# Patient Record
Sex: FEMALE | Race: ASIAN | NOT HISPANIC OR LATINO | URBAN - METROPOLITAN AREA
[De-identification: names, ages, dates, MRNs, and addresses within clinical notes are randomized per-mention and may not be internally consistent; named-entity substitution may affect disease eponyms.]

---

## 2017-06-20 ENCOUNTER — IMPORTED ENCOUNTER (OUTPATIENT)
Dept: URBAN - METROPOLITAN AREA CLINIC 1 | Facility: CLINIC | Age: 52
End: 2017-06-20

## 2017-06-20 PROBLEM — H43.811: Noted: 2017-06-20

## 2017-06-20 PROBLEM — H04.123: Noted: 2017-06-20

## 2017-06-20 PROBLEM — H25.813: Noted: 2017-06-20

## 2017-06-20 PROBLEM — H16.143: Noted: 2017-06-20

## 2017-06-20 PROCEDURE — 92015 DETERMINE REFRACTIVE STATE: CPT

## 2017-06-20 PROCEDURE — 92014 COMPRE OPH EXAM EST PT 1/>: CPT

## 2017-06-20 NOTE — PATIENT DISCUSSION
1.  Cataract OU: Observe for now without intervention. The patient was advised to contact us if any change or worsening of vision2. AMANDA w/ PEK OU- H/o punctal plugs internalized OU. Cont ATs QID OU. Consider Restasis/Xiidra will hold off for now 3. PVD w/o Tear OD - RD precautions. MRX for glasses given Return for an appointment for Return as scheduled with Dr. Reed Higgins. Return for an appointment in 1 year 27 with Dr. Rajendra Slade.

## 2018-07-16 ENCOUNTER — IMPORTED ENCOUNTER (OUTPATIENT)
Dept: URBAN - METROPOLITAN AREA CLINIC 1 | Facility: CLINIC | Age: 53
End: 2018-07-16

## 2018-07-16 PROBLEM — H25.813: Noted: 2018-07-16

## 2018-07-16 PROBLEM — H04.123: Noted: 2018-07-16

## 2018-07-16 PROBLEM — H43.811: Noted: 2018-07-16

## 2018-07-16 PROBLEM — H16.143: Noted: 2018-07-16

## 2018-07-16 PROCEDURE — 92015 DETERMINE REFRACTIVE STATE: CPT

## 2018-07-16 PROCEDURE — 92014 COMPRE OPH EXAM EST PT 1/>: CPT

## 2018-07-16 NOTE — PATIENT DISCUSSION
1.  AMANDA w/ PEK OU- Improved. The increase of artificial tears OU to QID were recommended routinely. Plugs internalized LLs OU. 2.  Cataract OU: Observe for now without intervention. The patient was advised to contact us if any change or worsening of vision3. PVD w/o Tear OD - RD precautions. Return for an appointment for 27 in 1 year with Dr. Jeremy Fitzgerald.

## 2019-08-01 ENCOUNTER — IMPORTED ENCOUNTER (OUTPATIENT)
Dept: URBAN - METROPOLITAN AREA CLINIC 1 | Facility: CLINIC | Age: 54
End: 2019-08-01

## 2019-08-01 PROBLEM — H16.143: Noted: 2019-08-01

## 2019-08-01 PROBLEM — H25.813: Noted: 2019-08-01

## 2019-08-01 PROBLEM — H43.813: Noted: 2019-08-01

## 2019-08-01 PROBLEM — H04.123: Noted: 2019-08-01

## 2019-08-01 PROCEDURE — 92015 DETERMINE REFRACTIVE STATE: CPT

## 2019-08-01 PROCEDURE — 92014 COMPRE OPH EXAM EST PT 1/>: CPT

## 2019-08-01 NOTE — PATIENT DISCUSSION
1.  Cataract OU: Observe for now without intervention. The patient was advised to contact us if any change or worsening of vision2. PVD OU - RD precautions. 3.  AMANDA w/ PEK OU- H/o Internalized Plugs LLs OU. Cont ATs QID OU Routinely. Letter to PCP MRx given Return for an appointment in 1 yr 27 with Dr. Matias Hogan.

## 2020-08-07 ENCOUNTER — IMPORTED ENCOUNTER (OUTPATIENT)
Dept: URBAN - METROPOLITAN AREA CLINIC 1 | Facility: CLINIC | Age: 55
End: 2020-08-07

## 2020-08-07 PROBLEM — H16.143: Noted: 2020-08-07

## 2020-08-07 PROBLEM — H43.813: Noted: 2020-08-07

## 2020-08-07 PROBLEM — H25.813: Noted: 2020-08-07

## 2020-08-07 PROBLEM — H04.123: Noted: 2020-08-07

## 2020-08-07 PROCEDURE — 92014 COMPRE OPH EXAM EST PT 1/>: CPT

## 2020-08-07 PROCEDURE — 92015 DETERMINE REFRACTIVE STATE: CPT

## 2020-08-07 NOTE — PATIENT DISCUSSION
1.  AMANDA w/ PEK OU-Recommend using ATs Q2HS OU routinely. Consider Restasis if symptoms worsen. 2. Cataract OU: Observe for now without intervention. The patient was advised to contact us if any change or worsening of vision3. PVD OU - RD precautions. Letter to [facility] for glasses given. Return for an appointment in 1 year 27 with Dr. Chula Roberts.

## 2021-08-05 ENCOUNTER — IMPORTED ENCOUNTER (OUTPATIENT)
Dept: URBAN - METROPOLITAN AREA CLINIC 1 | Facility: CLINIC | Age: 56
End: 2021-08-05

## 2021-08-05 PROBLEM — H04.123: Noted: 2021-08-05

## 2021-08-05 PROBLEM — H16.143: Noted: 2021-08-05

## 2021-08-05 PROBLEM — H25.813: Noted: 2021-08-05

## 2021-08-05 PROBLEM — H04.422: Noted: 2021-08-05

## 2021-08-05 PROCEDURE — 99214 OFFICE O/P EST MOD 30 MIN: CPT

## 2021-08-05 PROCEDURE — 92015 DETERMINE REFRACTIVE STATE: CPT

## 2021-08-05 NOTE — PATIENT DISCUSSION
1.  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 Will refer patient to Dr. Kalpana Riggs for a 3 snip procedure. Begin Ocuflox TID OS (erx'd) until seen by Dr. Kalpana Riggs. 2.  AMANDA w/ PEK OU -- LL plug internalized. Continue the use of OTC ATs QID or more as needed. Consider Xiidra/Restasis without improvement. 3. Cataract OU -- Observe for now without intervention. The patient was advised to contact us if any change or worsening of vision. 4.  PVD w/o tear OU -- RD precautions. Return for an appointment in 6 months for a 10 k check with Dr. Renetta Pittman.

## 2022-02-10 ENCOUNTER — FOLLOW UP (OUTPATIENT)
Dept: URBAN - METROPOLITAN AREA CLINIC 1 | Facility: CLINIC | Age: 57
End: 2022-02-10

## 2022-02-10 DIAGNOSIS — H04.123: ICD-10-CM

## 2022-02-10 PROCEDURE — 99213 OFFICE O/P EST LOW 20 MIN: CPT

## 2022-02-10 RX ORDER — CYCLOSPORINE 0.5 MG/ML: 1 EMULSION OPHTHALMIC TWICE A DAY

## 2022-02-10 ASSESSMENT — TONOMETRY
OD_IOP_MMHG: 13
OS_IOP_MMHG: 13

## 2022-02-10 ASSESSMENT — VISUAL ACUITY
OD_CC: J1
OD_CC: 20/20-2
OS_CC: J2
OS_CC: 20/20

## 2022-02-10 NOTE — PATIENT DISCUSSION
(LL Plugs internalized OU) No improvement on ATs. Begin Restasis BID OU (Erx'd). Continue ATs QID OU, routinely.

## 2022-04-02 ASSESSMENT — VISUAL ACUITY
OD_CC: J1
OS_SC: 20/20
OD_SC: 20/25
OD_CC: J1
OS_SC: 20/20
OS_CC: J1
OS_SC: 20/20
OS_SC: 20/25
OD_SC: 20/20
OD_SC: 20/25-2
OD_SC: 20/25-2
OD_SC: 20/30
OS_CC: J1
OS_SC: 20/20

## 2022-04-02 ASSESSMENT — TONOMETRY
OD_IOP_MMHG: 13
OD_IOP_MMHG: 13
OS_IOP_MMHG: 13
OD_IOP_MMHG: 12
OD_IOP_MMHG: 12
OD_IOP_MMHG: 14
OS_IOP_MMHG: 13
OS_IOP_MMHG: 14
OS_IOP_MMHG: 12
OS_IOP_MMHG: 13

## 2022-06-02 ENCOUNTER — FOLLOW UP (OUTPATIENT)
Dept: URBAN - METROPOLITAN AREA CLINIC 1 | Facility: CLINIC | Age: 57
End: 2022-06-02

## 2022-06-02 DIAGNOSIS — H04.123: ICD-10-CM

## 2022-06-02 PROCEDURE — 92012 INTRM OPH EXAM EST PATIENT: CPT

## 2022-06-02 ASSESSMENT — VISUAL ACUITY
OS_CC: 20/20
OD_CC: 20/20

## 2022-06-02 ASSESSMENT — TONOMETRY
OS_IOP_MMHG: 12
OD_IOP_MMHG: 12

## 2022-06-02 NOTE — PATIENT DISCUSSION
(LL Plugs internalized OU) PEK much improved. ContinueRestasis BID OU (Erx'd). Continue ATs QID OU, routinely.

## 2022-12-05 ENCOUNTER — COMPREHENSIVE EXAM (OUTPATIENT)
Dept: URBAN - METROPOLITAN AREA CLINIC 1 | Facility: CLINIC | Age: 57
End: 2022-12-05

## 2022-12-05 PROCEDURE — 92015 DETERMINE REFRACTIVE STATE: CPT

## 2022-12-05 PROCEDURE — 92014 COMPRE OPH EXAM EST PT 1/>: CPT

## 2022-12-05 ASSESSMENT — VISUAL ACUITY
OS_BAT: 20/60
OS_CC: J2
OS_CC: 20/20
OD_CC: J2
OD_BAT: 20/150
OD_CC: 20/20-2

## 2022-12-05 ASSESSMENT — TONOMETRY
OD_IOP_MMHG: 14
OS_IOP_MMHG: 14

## 2022-12-05 NOTE — PATIENT DISCUSSION
(LL Plugs internalized OU) PEK slightly increased today. Patient states that she has not taken Restasis in about a year. Recommend restarting Restasis BID OU (Erx'd again). Continue ATs QID OU, routinely.

## 2023-06-12 ENCOUNTER — FOLLOW UP (OUTPATIENT)
Dept: URBAN - METROPOLITAN AREA CLINIC 1 | Facility: CLINIC | Age: 58
End: 2023-06-12

## 2023-06-12 DIAGNOSIS — H25.813: ICD-10-CM

## 2023-06-12 DIAGNOSIS — H04.123: ICD-10-CM

## 2023-06-12 DIAGNOSIS — H16.143: ICD-10-CM

## 2023-06-12 PROCEDURE — 99213 OFFICE O/P EST LOW 20 MIN: CPT

## 2023-06-12 ASSESSMENT — VISUAL ACUITY
OS_CC: 20/20
OS_BAT: 20/60
OD_BAT: 20/150
OD_CC: 20/20

## 2023-06-12 ASSESSMENT — TONOMETRY
OD_IOP_MMHG: 15
OS_IOP_MMHG: 14

## 2023-12-18 ENCOUNTER — COMPREHENSIVE EXAM (OUTPATIENT)
Dept: URBAN - METROPOLITAN AREA CLINIC 1 | Facility: CLINIC | Age: 58
End: 2023-12-18

## 2023-12-18 DIAGNOSIS — H16.143: ICD-10-CM

## 2023-12-18 DIAGNOSIS — H43.811: ICD-10-CM

## 2023-12-18 DIAGNOSIS — H25.813: ICD-10-CM

## 2023-12-18 DIAGNOSIS — H04.123: ICD-10-CM

## 2023-12-18 PROCEDURE — 99214 OFFICE O/P EST MOD 30 MIN: CPT

## 2023-12-18 ASSESSMENT — VISUAL ACUITY
OD_CC: 20/25
OS_CC: J1
OD_BAT: 20/150
OD_CC: J1
OS_BAT: 20/200
OS_CC: 20/30

## 2023-12-18 ASSESSMENT — TONOMETRY
OD_IOP_MMHG: 15
OS_IOP_MMHG: 15

## 2024-06-13 ENCOUNTER — FOLLOW UP (OUTPATIENT)
Dept: URBAN - METROPOLITAN AREA CLINIC 1 | Facility: CLINIC | Age: 59
End: 2024-06-13

## 2024-06-13 DIAGNOSIS — H04.123: ICD-10-CM

## 2024-06-13 DIAGNOSIS — H16.143: ICD-10-CM

## 2024-06-13 DIAGNOSIS — H25.813: ICD-10-CM

## 2024-06-13 PROCEDURE — 99213 OFFICE O/P EST LOW 20 MIN: CPT

## 2024-06-13 PROCEDURE — 92015 DETERMINE REFRACTIVE STATE: CPT

## 2024-06-13 ASSESSMENT — VISUAL ACUITY
OS_BAT: 20/200
OD_BAT: 20/150
OS_CC: 20/30
OD_CC: 20/30

## 2024-06-13 ASSESSMENT — TONOMETRY
OS_IOP_MMHG: 15
OD_IOP_MMHG: 15

## 2024-07-03 ENCOUNTER — PRE-OP/H&P (OUTPATIENT)
Dept: URBAN - METROPOLITAN AREA CLINIC 1 | Facility: CLINIC | Age: 59
End: 2024-07-03

## 2024-07-03 VITALS
HEIGHT: 69 IN | WEIGHT: 129 LBS | SYSTOLIC BLOOD PRESSURE: 118 MMHG | BODY MASS INDEX: 19.11 KG/M2 | DIASTOLIC BLOOD PRESSURE: 68 MMHG

## 2024-07-03 DIAGNOSIS — H25.813: ICD-10-CM

## 2024-07-03 PROCEDURE — 92025 CPTRIZED CORNEAL TOPOGRAPHY: CPT | Mod: NC

## 2024-07-03 PROCEDURE — 99213 OFFICE O/P EST LOW 20 MIN: CPT

## 2024-07-03 PROCEDURE — 92136 OPHTHALMIC BIOMETRY: CPT

## 2024-07-03 ASSESSMENT — VISUAL ACUITY
OS_SC: 20/50
OS_BAT: 20/200
OD_BAT: 20/150
OD_SC: 20/50

## 2024-07-03 ASSESSMENT — TONOMETRY
OS_IOP_MMHG: 15
OD_IOP_MMHG: 15

## 2024-10-08 ENCOUNTER — PRE-OP/H&P (OUTPATIENT)
Dept: URBAN - METROPOLITAN AREA CLINIC 1 | Facility: CLINIC | Age: 59
End: 2024-10-08

## 2024-10-08 VITALS
DIASTOLIC BLOOD PRESSURE: 80 MMHG | WEIGHT: 129 LBS | BODY MASS INDEX: 19.11 KG/M2 | SYSTOLIC BLOOD PRESSURE: 139 MMHG | HEART RATE: 77 BPM | HEIGHT: 69 IN

## 2024-10-08 DIAGNOSIS — H25.813: ICD-10-CM

## 2024-10-08 PROCEDURE — 92136 OPHTHALMIC BIOMETRY: CPT

## 2024-10-08 PROCEDURE — 99213 OFFICE O/P EST LOW 20 MIN: CPT

## 2024-10-08 PROCEDURE — 92025 CPTRIZED CORNEAL TOPOGRAPHY: CPT

## 2024-10-30 ENCOUNTER — SURGERY/PROCEDURE (OUTPATIENT)
Dept: URBAN - METROPOLITAN AREA SURGERY 1 | Facility: SURGERY | Age: 59
End: 2024-10-30

## 2024-10-30 DIAGNOSIS — H25.812: ICD-10-CM

## 2024-10-30 PROCEDURE — 66984 XCAPSL CTRC RMVL W/O ECP: CPT

## 2024-10-31 ENCOUNTER — POST-OP (OUTPATIENT)
Dept: URBAN - METROPOLITAN AREA CLINIC 2 | Facility: CLINIC | Age: 59
End: 2024-10-31

## 2024-10-31 DIAGNOSIS — H25.811: ICD-10-CM

## 2024-10-31 DIAGNOSIS — Z96.1: ICD-10-CM

## 2024-10-31 PROCEDURE — 99024 POSTOP FOLLOW-UP VISIT: CPT

## 2024-11-05 ENCOUNTER — POST OP/EVAL OF SECOND EYE (OUTPATIENT)
Dept: URBAN - METROPOLITAN AREA CLINIC 1 | Facility: CLINIC | Age: 59
End: 2024-11-05

## 2024-11-05 VITALS
DIASTOLIC BLOOD PRESSURE: 79 MMHG | SYSTOLIC BLOOD PRESSURE: 147 MMHG | HEART RATE: 75 BPM | BODY MASS INDEX: 23.04 KG/M2 | HEIGHT: 63 IN | WEIGHT: 130 LBS

## 2024-11-05 DIAGNOSIS — Z96.1: ICD-10-CM

## 2024-11-05 DIAGNOSIS — H25.811: ICD-10-CM

## 2024-11-05 PROCEDURE — 92136 OPHTHALMIC BIOMETRY: CPT | Mod: 26

## 2024-11-05 PROCEDURE — 92025 CPTRIZED CORNEAL TOPOGRAPHY: CPT | Mod: NC

## 2024-11-13 ENCOUNTER — SURGERY/PROCEDURE (OUTPATIENT)
Dept: URBAN - METROPOLITAN AREA SURGERY 1 | Facility: SURGERY | Age: 59
End: 2024-11-13

## 2024-11-13 DIAGNOSIS — H25.811: ICD-10-CM

## 2024-11-13 PROCEDURE — 66984 XCAPSL CTRC RMVL W/O ECP: CPT | Mod: 79,RT

## 2024-11-14 ENCOUNTER — POST-OP (OUTPATIENT)
Dept: URBAN - METROPOLITAN AREA CLINIC 2 | Facility: CLINIC | Age: 59
End: 2024-11-14

## 2024-11-14 DIAGNOSIS — Z96.1: ICD-10-CM

## 2024-11-14 PROCEDURE — 99024 POSTOP FOLLOW-UP VISIT: CPT

## 2024-11-21 ENCOUNTER — DIAGNOSTICS ONLY (OUTPATIENT)
Dept: URBAN - METROPOLITAN AREA CLINIC 1 | Facility: CLINIC | Age: 59
End: 2024-11-21

## 2024-11-21 DIAGNOSIS — Z96.1: ICD-10-CM

## 2024-11-21 PROCEDURE — 92015 DETERMINE REFRACTIVE STATE: CPT | Mod: NC

## 2024-12-09 ENCOUNTER — POST-OP (OUTPATIENT)
Age: 59
End: 2024-12-09

## 2024-12-09 DIAGNOSIS — Z96.1: ICD-10-CM

## 2025-02-21 ENCOUNTER — EMERGENCY VISIT (OUTPATIENT)
Age: 60
End: 2025-02-21

## 2025-02-21 DIAGNOSIS — H11.32: ICD-10-CM

## 2025-02-21 PROCEDURE — 99213 OFFICE O/P EST LOW 20 MIN: CPT

## 2025-06-23 ENCOUNTER — COMPREHENSIVE EXAM (OUTPATIENT)
Age: 60
End: 2025-06-23

## 2025-06-23 DIAGNOSIS — Z96.1: ICD-10-CM

## 2025-06-23 DIAGNOSIS — H43.811: ICD-10-CM

## 2025-06-23 DIAGNOSIS — H04.123: ICD-10-CM

## 2025-06-23 DIAGNOSIS — H16.143: ICD-10-CM

## 2025-06-23 PROCEDURE — 99214 OFFICE O/P EST MOD 30 MIN: CPT
